# Patient Record
Sex: MALE | Race: WHITE | NOT HISPANIC OR LATINO | URBAN - NONMETROPOLITAN AREA
[De-identification: names, ages, dates, MRNs, and addresses within clinical notes are randomized per-mention and may not be internally consistent; named-entity substitution may affect disease eponyms.]

---

## 2017-07-07 PROBLEM — E11.9: Noted: 2017-07-07

## 2017-07-07 PROBLEM — H25.813: Noted: 2017-07-07

## 2017-07-07 PROBLEM — E11.9: Noted: 2019-12-30

## 2017-07-07 PROBLEM — H35.3111: Noted: 2017-07-07

## 2017-07-07 PROBLEM — H25.813: Noted: 2019-12-30

## 2018-12-20 NOTE — PATIENT DISCUSSION
SHOWED PATIENT IN PHOROPTER IMPACT ON NEAR AND DISTANCE VISION. HE SEEMED UNHAPPY WITH HIS DV AFTER +1.00 ADD TO OS.

## 2018-12-20 NOTE — PATIENT DISCUSSION
"PATIENT WANTS TO ENHANCE HIS ""MONOVISION"" BUT SEEMS UNWILLING TO TAKE THE HIT IN HIS DISTANCE VISION. "

## 2019-08-19 ENCOUNTER — IMPORTED ENCOUNTER (OUTPATIENT)
Dept: URBAN - NONMETROPOLITAN AREA CLINIC 1 | Facility: CLINIC | Age: 64
End: 2019-08-19

## 2019-08-19 PROCEDURE — 99213 OFFICE O/P EST LOW 20 MIN: CPT

## 2019-08-19 NOTE — PATIENT DISCUSSION
Cataract(s)-Visually significant.-Cataract(s) causing symptomatic impairment of visual function not correctable with a tolerable change in glasses or contact lenses lighting or non-operative means resulting in specific activity limitations and/or participation restrictions including but not limited to reading viewing television driving or meeting vocational or recreational needs. -Expectation is clearer vision and reduced glare disability after cataract removal.-Refer for cataract evaluation pt will sched at his convenience DM s DR-Last BS: unsure-Last A1C: 6.0 June-Stressed the importance of keeping blood sugars under control blood pressure under control and weight normalization and regular visits with PCP. -Explained the possible effects of poorly controlled diabetes and the damage that diabetes can cause to ocular health. -Patient to check HgbA1C.-Pt instructed to contact our office with any vision changes. AMD - dry-Explained dry AMD.-Continue AREDS 2 MVT. -Continue Amsler grid monitoring daily. Pt is to contact our office if any changes are noted. RTC PRN Cat Eval/1 yr CEE Letter to Cornelio 08/19/19; 's Notes: MR 7/13/18DFE 08/19/19

## 2019-12-30 ENCOUNTER — IMPORTED ENCOUNTER (OUTPATIENT)
Dept: URBAN - NONMETROPOLITAN AREA CLINIC 1 | Facility: CLINIC | Age: 64
End: 2019-12-30

## 2019-12-30 PROCEDURE — 92014 COMPRE OPH EXAM EST PT 1/>: CPT

## 2020-02-12 PROBLEM — E11.9: Noted: 2020-02-12

## 2020-02-12 PROBLEM — H25.813: Noted: 2020-02-12

## 2020-02-12 PROBLEM — H35.3111: Noted: 2020-02-12

## 2020-02-18 ENCOUNTER — IMPORTED ENCOUNTER (OUTPATIENT)
Dept: URBAN - NONMETROPOLITAN AREA CLINIC 1 | Facility: CLINIC | Age: 65
End: 2020-02-18

## 2020-03-09 ENCOUNTER — IMPORTED ENCOUNTER (OUTPATIENT)
Dept: URBAN - NONMETROPOLITAN AREA CLINIC 1 | Facility: CLINIC | Age: 65
End: 2020-03-09

## 2020-03-09 PROBLEM — Z98.41: Noted: 2020-03-09

## 2020-03-09 PROBLEM — H25.812: Noted: 2020-03-16

## 2020-03-09 PROCEDURE — 92136 OPHTHALMIC BIOMETRY: CPT

## 2020-03-09 PROCEDURE — V2787 ASTIGMATISM-CORRECT FUNCTION: HCPCS

## 2020-03-09 PROCEDURE — 99024 POSTOP FOLLOW-UP VISIT: CPT

## 2020-03-09 PROCEDURE — 66984 XCAPSL CTRC RMVL W/O ECP: CPT

## 2020-03-09 NOTE — PATIENT DISCUSSION
s/p PCIOL-Pt doing well s/p PCIOL. -Continue post-op gtts according to instruction sheet and sleep with eye shield over eye for 7 nights.-Pt ALLERGIC TO BESIVANCE START TOBRAMYCIN QID OD written Rx given -Avoid bending at the waist lifting anything over 5lbs and dirty or isiah environments. -RTC .; 's Notes: MR 7/13/18DFE 08/19/19

## 2020-03-16 ENCOUNTER — IMPORTED ENCOUNTER (OUTPATIENT)
Dept: URBAN - NONMETROPOLITAN AREA CLINIC 1 | Facility: CLINIC | Age: 65
End: 2020-03-16

## 2020-03-16 PROBLEM — I10: Noted: 2020-03-16

## 2020-03-16 PROBLEM — Z98.41: Noted: 2020-03-16

## 2020-03-16 PROBLEM — Z01.818: Noted: 2020-03-16

## 2020-03-16 PROBLEM — H25.812: Noted: 2020-03-16

## 2020-03-16 PROBLEM — E11.9: Noted: 2020-03-16

## 2020-03-16 PROBLEM — E78.5: Noted: 2020-03-16

## 2020-03-16 PROCEDURE — 99024 POSTOP FOLLOW-UP VISIT: CPT

## 2020-03-16 NOTE — PATIENT DISCUSSION
Medical Clearance-Medical clearance done today. -No outstanding concerns that would preclude surgery.-Patient is cleared to proceed with scheduled surgery.; 's Notes: MR 7/13/18DFE 08/19/19

## 2020-03-16 NOTE — PATIENT DISCUSSION
Cataract(s)-Visually significant cataract OS . -Cataract(s) causing symptomatic impairment of visual function not correctable with a tolerable change in glasses or contact lenses lighting or non-operative means resulting in specific activity limitations and/or participation restrictions including but not limited to reading viewing television driving or meeting vocational or recreational needs. -Expectation is clearer vision and functional improvement in symptoms as well as reduced glare disability after cataract removal.-Recommend LRI/Lensx based on previous OPD testing and lifestyle questionnaire.-All questions were answered regarding surgery including pre and post-op medications appointments activity restrictions and anesthetic usage.-The risks benefits and alternatives and special risk factors for the patient were discussed in detail including but not limited to: bleeding infection retinal detachment vitreous loss problems with the implant and possible need for additional surgery.-Although rare the possibility of complete vision loss was discussed.-The need for glasses post-operatively was discussed.-Patient elects to proceed with cataract surgery OS . Will schedule at patient's convenience. s/p PCIOL OD Toric/Lensx -Pt doing well at 1 week s/p PCIOL. -Continue post-op gtts according to instruction sheet.-Okay to resume usual activites and d/c eye shield.; 's Notes: MR 7/13/18DFE 08/19/19

## 2020-06-23 PROBLEM — H25.812: Noted: 2020-06-23

## 2020-06-23 PROBLEM — Z01.818: Noted: 2020-06-23

## 2020-06-23 PROBLEM — Z98.41: Noted: 2020-06-23

## 2020-06-23 PROBLEM — E11.9: Noted: 2020-06-23

## 2020-06-27 ENCOUNTER — IMPORTED ENCOUNTER (OUTPATIENT)
Dept: URBAN - NONMETROPOLITAN AREA CLINIC 1 | Facility: CLINIC | Age: 65
End: 2020-06-27

## 2020-06-27 PROBLEM — Z96.1: Noted: 2020-06-27

## 2020-06-27 PROBLEM — H25.812: Noted: 2020-06-27

## 2020-06-27 PROBLEM — E11.9: Noted: 2020-06-27

## 2020-06-27 PROCEDURE — 92014 COMPRE OPH EXAM EST PT 1/>: CPT

## 2020-06-27 NOTE — PATIENT DISCUSSION
Cataract OS-Visually significant cataract OS. -Cataract causing symptomatic impairment of visual function not correctable with a tolerable change in glasses or contact lenses lighting or non-operative means resulting in specific activity limitations and/or participation restrictions including but not limited to reading viewing television driving or meeting vocational or recreational needs. -Expectation is clearer vision and functional improvement in symptoms as well as reduced glare disability after cataract removal.-Recommend LenSx w/ Limbal relaxing incision based on previous OPD testing and lifestyle questionnaire.-All questions were answered regarding surgery including pre and post-op medications appointments activity restrictions and anesthetic usage.-The risks benefits and alternatives and special risk factors for the patient were discussed in detail including but not limited to: bleeding infection retinal detachment vitreous loss problems with the implant and possible need for additional surgery.-Although rare the possibility of complete vision loss was discussed.-The possible need for glasses post-operatively was discussed.-Order medical clearance exam based on history of high cholesterol irregular heart beat and hypertension and diabets-Patient elects to proceed with cataract surgery OS.***Patient elects LenSx with Limbal Relaxing Incision OSPseudophakia OD- Recommend observation; 's Notes: MR 7/13/18DFE 08/19/19

## 2020-07-23 ENCOUNTER — IMPORTED ENCOUNTER (OUTPATIENT)
Dept: URBAN - NONMETROPOLITAN AREA CLINIC 1 | Facility: CLINIC | Age: 65
End: 2020-07-23

## 2020-07-23 PROBLEM — E11.9: Noted: 2020-07-23

## 2020-07-23 PROBLEM — Z98.42: Noted: 2020-07-23

## 2020-07-23 PROCEDURE — 99024 POSTOP FOLLOW-UP VISIT: CPT

## 2020-07-23 NOTE — PATIENT DISCUSSION
s/p PC IOL OS LRI/LenSx 7/22/2020-  discussed findings w/patient-  Pt doing well s/p PCIOL-  Continue post-op gtts according to instruction sheet and sleep with eye shield over eye for 7 nights. -  Avoid bending at the waist lifting anything over 5lbs and dirty or isiah environments.-  RTC 1 week as scheduled or prn; 's Notes: MR 7/13/18DFE 08/19/19

## 2020-07-30 ENCOUNTER — IMPORTED ENCOUNTER (OUTPATIENT)
Dept: URBAN - NONMETROPOLITAN AREA CLINIC 1 | Facility: CLINIC | Age: 65
End: 2020-07-30

## 2020-07-30 PROCEDURE — 99024 POSTOP FOLLOW-UP VISIT: CPT

## 2020-07-30 NOTE — PATIENT DISCUSSION
s/p PC IOL OS LRI/LenSx 7/22/2020-  discussed findings w/patient-  Pt doing well s/p PC IOL -  Pt doing well at 1 week s/p PCIOL. -  Continue post-op gtts according to instruction sheet.-  Okay to resume usual activites and d/c eye shield-  Continue to monitor at 3 mo DFE or prn; 's Notes: MR 7/13/18DFE 08/19/19

## 2020-10-29 ENCOUNTER — IMPORTED ENCOUNTER (OUTPATIENT)
Dept: URBAN - NONMETROPOLITAN AREA CLINIC 1 | Facility: CLINIC | Age: 65
End: 2020-10-29

## 2020-10-29 PROCEDURE — 99213 OFFICE O/P EST LOW 20 MIN: CPT

## 2020-10-29 NOTE — PATIENT DISCUSSION
Pseudophakia w/Mild PCO OU-  discussed findings w/patient-  mild PCO noted OU-  no treatment indicated at this time-  continue to monitor as scheduled or prnType 2 DM w/o Retinopathy OU-  discussed findings w/patient-  Stressed the importance of keeping blood sugars under control blood pressure under control and weight normalization and regular visits with PCP.-  Explained the possible effects of poorly controlled diabetes and the damage that diabetes can cause to ocular health. -  Pt instructed to contact our office with any vision changes. -  Continue to monitor as scheduled or prn; 's Notes: MR 7/13/18DFE 10/29/2020

## 2020-12-31 PROBLEM — E11.9: Noted: 2020-12-31

## 2020-12-31 PROBLEM — Z96.1: Noted: 2020-12-31

## 2020-12-31 PROBLEM — H26.493: Noted: 2020-12-31

## 2020-12-31 PROBLEM — H35.373: Noted: 2021-11-02

## 2021-11-01 ENCOUNTER — IMPORTED ENCOUNTER (OUTPATIENT)
Dept: URBAN - NONMETROPOLITAN AREA CLINIC 1 | Facility: CLINIC | Age: 66
End: 2021-11-01

## 2021-11-01 PROCEDURE — 99214 OFFICE O/P EST MOD 30 MIN: CPT

## 2021-11-01 PROCEDURE — 92134 CPTRZ OPH DX IMG PST SGM RTA: CPT

## 2021-11-01 NOTE — PATIENT DISCUSSION
Pseudophakia w/Mild PCO OU-  discussed findings w/patient-  mild PCO noted OU-  no treatment indicated at this time-  continue to monitor as scheduled or prnType 2 DM w/o Retinopathy OU-  discussed findings w/patient-  Stressed the importance of keeping blood sugars under control blood pressure under control and weight normalization and regular visits with PCP.-  Explained the possible effects of poorly controlled diabetes and the damage that diabetes can cause to ocular health. -  Pt instructed to contact our office with any vision changes. -  Continue to monitor as scheduled or prnERM OU-  discussed findings w/patient-  OCT Mac done 11/1/2021    OD: tr ERM    OS: tr ERM-  no treatment indicated at this time-  continue to monitor yearly or prn; 's Notes: MR Lorena 11/1/2021DFE 11/1/2021OCT Mac 11/1/2021

## 2022-04-10 ASSESSMENT — VISUAL ACUITY
OD_CC: 20/40
OD_CC: 20/30+2
OD_CC: 20/20-3
OD_SC: 20/60
OU_CC: 20/20
OU_SC: 20/25
OD_SC: 20/30-1
OU_CC: 20/20
OS_SC: 20/60
OS_CC: 20/20
OD_SC: 20/400
OS_CC: 20/40+2
OS_GLARE: 20/50
OS_SC: 20/25
OS_AM: 20/25
OS_SC: 20/400
OS_GLARE: 20/50
OS_CC: 20/400
OD_CC: 20/20
OD_CC: 20/20
OS_PH: 20/20
OS_CC: 20/25
OD_PH: 20/50
OS_GLARE: 20/50
OS_SC: 20/30- W/ GLASSES
OD_PAM: 20/70
OS_CC: 20/400
OD_CC: J1
OS_CC: 20/400
OS_AM: 20/20
OD_SC: 20/20-2
OS_AM: 20/70
OS_GLARE: 20/25
OD_CC: 20/20
OU_SC: 20/40
OS_CC: 20/30+2
OS_CC: 20/25+2
OD_GLARE: 20/70
OS_CC: J1
OS_SC: 20/25-1
OS_PH: 20/50
OD_GLARE: 20/20
OD_CC: 20/25

## 2022-04-10 ASSESSMENT — TONOMETRY
OD_IOP_MMHG: 16
OD_IOP_MMHG: 18
OS_IOP_MMHG: 18
OS_IOP_MMHG: 18
OD_IOP_MMHG: 15
OS_IOP_MMHG: 15
OS_IOP_MMHG: 16
OD_IOP_MMHG: 15
OD_IOP_MMHG: 18
OS_IOP_MMHG: 15
OS_IOP_MMHG: 16
OS_IOP_MMHG: 15
OD_IOP_MMHG: 18
OD_IOP_MMHG: 15
OS_IOP_MMHG: 18
OS_IOP_MMHG: 20
OD_IOP_MMHG: 17
OD_IOP_MMHG: 18

## 2022-11-10 ENCOUNTER — COMPREHENSIVE EXAM (OUTPATIENT)
Dept: URBAN - NONMETROPOLITAN AREA CLINIC 4 | Facility: CLINIC | Age: 67
End: 2022-11-10

## 2022-11-10 DIAGNOSIS — H26.493: ICD-10-CM

## 2022-11-10 DIAGNOSIS — E11.9: ICD-10-CM

## 2022-11-10 PROCEDURE — 66821 AFTER CATARACT LASER SURGERY: CPT

## 2022-11-10 PROCEDURE — 99214 OFFICE O/P EST MOD 30 MIN: CPT

## 2022-11-10 PROCEDURE — 92134 CPTRZ OPH DX IMG PST SGM RTA: CPT

## 2022-11-10 ASSESSMENT — VISUAL ACUITY
OU_SC: 20/25+1
OS_PH: 20/25
OD_SC: 20/25+2
OS_SC: 20/40+1

## 2022-11-10 ASSESSMENT — TONOMETRY
OD_IOP_MMHG: 18
OS_IOP_MMHG: 20

## 2022-11-10 NOTE — PROCEDURE NOTE: CLINICAL
PROCEDURE NOTE: YAG Capsulotomy OS. Anesthesia: Topical. The purpose and nature of the procedure, possible alternative methods of treatment, the risks involved and the possibility of complications were discussed with patient. The Patient wishes to proceed and the consent was signed. 1 gtt Prolensa applied. The laser was then performed under topical anesthesia with no complications. Post op instructions were given to patient as well as a follow-up appointment. Patient was advised to call our office if any questions or concerns. MJ 2.1 shots 8.

## 2022-11-10 NOTE — PATIENT DISCUSSION
(Surgical)  Visually Significant secondary to glare; schedule YAG OS then OD Cap. Pros, cons, risks and benefits discussed with patient. Patient wishes to proceed.

## 2022-11-29 ENCOUNTER — CLINIC PROCEDURE ONLY (OUTPATIENT)
Dept: URBAN - NONMETROPOLITAN AREA CLINIC 4 | Facility: CLINIC | Age: 67
End: 2022-11-29

## 2022-11-29 DIAGNOSIS — H26.491: ICD-10-CM

## 2022-11-29 PROCEDURE — 66821 AFTER CATARACT LASER SURGERY: CPT

## 2022-11-29 ASSESSMENT — VISUAL ACUITY
OS_SC: 20/25-2
OD_SC: 20/20
OU_SC: 20/20

## 2022-11-29 ASSESSMENT — TONOMETRY
OS_IOP_MMHG: 20
OD_IOP_MMHG: 20

## 2022-11-29 NOTE — PROCEDURE NOTE: CLINICAL
PROCEDURE NOTE: YAG Capsulotomy #12 OD. Diagnosis: Other Secondary Cataract. Anesthesia: Topical. The purpose and nature of the procedure, possible alternative methods of treatment, the risks involved and the possibility of complications were discussed with patient. The Patient wishes to proceed and the consent was signed. 1 gtt Prolensa applied. The laser was then performed under topical anesthesia with no complications. Post op instructions were given to patient as well as a follow-up appointment. Patient was advised to call our office if any questions or concerns. Angeline Tabor

## 2023-12-29 ENCOUNTER — COMPREHENSIVE EXAM (OUTPATIENT)
Dept: URBAN - NONMETROPOLITAN AREA CLINIC 4 | Facility: CLINIC | Age: 68
End: 2023-12-29

## 2023-12-29 DIAGNOSIS — H40.013: ICD-10-CM

## 2023-12-29 DIAGNOSIS — H52.4: ICD-10-CM

## 2023-12-29 DIAGNOSIS — E11.9: ICD-10-CM

## 2023-12-29 DIAGNOSIS — H43.813: ICD-10-CM

## 2023-12-29 DIAGNOSIS — Z96.1: ICD-10-CM

## 2023-12-29 PROCEDURE — 92133 CPTRZD OPH DX IMG PST SGM ON: CPT

## 2023-12-29 PROCEDURE — 76514 ECHO EXAM OF EYE THICKNESS: CPT

## 2023-12-29 PROCEDURE — 99214 OFFICE O/P EST MOD 30 MIN: CPT

## 2023-12-29 PROCEDURE — 92015 DETERMINE REFRACTIVE STATE: CPT

## 2023-12-29 ASSESSMENT — VISUAL ACUITY
OD_CC: 20/25
OS_SC: 20/30+1
OD_SC: 20/25-1
OU_SC: 20/20-2
OU_CC: 20/25
OS_CC: 20/25

## 2023-12-29 ASSESSMENT — TONOMETRY
OS_IOP_MMHG: 24
OD_IOP_MMHG: 20

## 2023-12-29 ASSESSMENT — PACHYMETRY
OS_CT_UM: 621
OD_CT_UM: 592

## 2025-01-02 ENCOUNTER — COMPREHENSIVE EXAM (OUTPATIENT)
Age: 70
End: 2025-01-02

## 2025-01-02 DIAGNOSIS — H43.813: ICD-10-CM

## 2025-01-02 DIAGNOSIS — E11.9: ICD-10-CM

## 2025-01-02 DIAGNOSIS — H52.4: ICD-10-CM

## 2025-01-02 DIAGNOSIS — Z96.1: ICD-10-CM

## 2025-01-02 DIAGNOSIS — H40.013: ICD-10-CM

## 2025-01-02 PROCEDURE — 99214 OFFICE O/P EST MOD 30 MIN: CPT
